# Patient Record
Sex: FEMALE | Race: BLACK OR AFRICAN AMERICAN | Employment: PART TIME | URBAN - METROPOLITAN AREA
[De-identification: names, ages, dates, MRNs, and addresses within clinical notes are randomized per-mention and may not be internally consistent; named-entity substitution may affect disease eponyms.]

---

## 2017-11-27 ENCOUNTER — HOSPITAL ENCOUNTER (OUTPATIENT)
Dept: PHYSICAL THERAPY | Age: 52
Discharge: HOME | End: 2017-11-27
Payer: OTHER GOVERNMENT

## 2017-11-27 PROCEDURE — 97166 OT EVAL MOD COMPLEX 45 MIN: CPT

## 2017-11-27 PROCEDURE — 97535 SELF CARE MNGMENT TRAINING: CPT
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
## 2017-11-27 NOTE — PROGRESS NOTES
[facility]  [address] [facility] [address]  [place], [address]  [phone] fax    Plan of Care/Statement of Necessity for Occupational Therapy Services    Patient name: Katie Rivas Start of Care: 2017   Referral source: Ying Lizama NP : 1965    Medical Diagnosis: Lymphedema of right upper extremity [I89.0]  Primary malignant neoplasm of central portion of right breast in female [facility]) [C50.111]   Onset Date:    Treatment Diagnosis: right UE and chest lymphedmea   Prior Hospitalization: see medical history Provider#: [id number]   Medications: Verified on Patient summary List    Comorbidities: R breast cancer, diabetes, thyroid, HTN   Prior Level of Function: Independent without limitations in ADL and IADL activities. The Plan of Care and following information is based on the information from the initial evaluation. Assessment/ key information: Patient is a 50 y.o. female with a chief complaint of intermittent pain and fullness in the right UE. Patient reports 4 month history of progressive discomfort and swelling in the right UE. She reports that in the recent past 3 weeks she has had some relief and decreased pain. Her pain/discomfort was noted to be greater when increased activities were required of her at work and home. She presents with heavy scarring at the site of surgery in the right breast.  She has decreased mobilization of the tissues around and in the surgical area and was noted to have severe adherence to the chest wall. The patient was also noted to have decreased AROM of the right shoulder and scapula with noted protraction limiting ROM especially with IR of the shoulder. Patient also complained of fatigue with testing to provoke thoracic outlet syndrome given her scarring and UE positioning.         Patient will benefit from skilled OT services to address ROM deficits, analyze and address soft tissue restrictions and lymphedema to address rehab goals per plan of care  Treatment Protocol:   o Manual Lymphatic Drainage   o Soft Tissue Mobilization/MFR   o Compression Bandaging   o Skin Care/Wound care   o Decongestive Exercises/Self MFR/Strengthening   o Education   o Compression Garment    Evaluation Complexity: History MEDIUM Complexity : Expanded review of history including physical, cognitive and psychosocial  history  Examination MEDIUM Complexity : 3-5 performance deficits relating to physical, cognitive , or psychosocial skils that result in activity limitations and / or participation restrictions Clinical Decision Making MEDIUM Complexity : Patient may present with comorbidities that affect occupational performnce. Miniml to moderate modification of tasks or assistance (eg, physical or verbal ) with assesment(s) is necessary to enable patient to complete evaluation   Overall Complexity Rating: MEDIUM    Patient would benefit from OT services for the following problems:  Problem List: Decreased range of motion and Edema effecting function     Treatment Plan may include any combination of the following: Therapeutic exercise, Physical agent/modality, Scar management, Manual therapy, Patient education and Other    Patient / Family readiness to learn indicated by: asking questions and interest    Persons(s) to be included in education:   patient (P)    Barriers to Learning/Limitations: None    Patient Goal (s): \"Reduce pain and discomfort in the right UE, reduce swelling    Patient Self Reported Health Status: good    Rehabilitation Potential: good    Goals:    Short Term Goals: To be accomplished in 4 weeks  1) Pt will be Independent with skin care routine to decrease risk of infection. 2) Patient will demonstrate decongestion of limb by 1-2% . 3) Pt will verbalize with 100% accuracy which signs and symptoms to report immediately to physician concerning their condition. Long Term Goals:  To be accomplished in 8 weeks  1) Pt will be Independent with compression management routine consisting of skin care, decongestive exercises, self-manual lymphatic stimulation techniques, strengthening and motion exercises, and don/doff/care of appropriate compression garment(s). 2) Pt will be Independent in don/doff/care of/wearing schedule of light compression. 3) Patient will demonstrate decongestion of limb by 3% to reduce symptoms  4) Pt will increase ROM of affected limb by 15 degrees in shoulder flexion and abduction in order to increase functional ROM and reduce symptoms    Frequency / Duration: Patient to be seen 2 times per week for 8 weeks:    Patient/ Caregiver education and instruction: Diagnosis, prognosis, self care    Functional Status Measure:   Patient's:NA     The severity rating is based on clinical judgment and the FOTO score. Ana Maria Jerome OT, CHT, CLT 11/27/2017 2:58 PM      Ana Maria Jerome OT 11/27/2017 5:04 PM  ________________________________________________________________________    I certify that the above Therapy Services are being furnished while the patient is under my care. I agree with the treatment plan and certify that this therapy is necessary.     Physician's Signature:____________________  Date:____________Time:__________    Please sign and return to [facility] [address]  [place], [address]  [phone] fax

## 2017-11-29 ENCOUNTER — HOSPITAL ENCOUNTER (OUTPATIENT)
Dept: PHYSICAL THERAPY | Age: 52
Discharge: HOME | End: 2017-11-29
Payer: OTHER GOVERNMENT

## 2017-11-29 PROCEDURE — 97110 THERAPEUTIC EXERCISES: CPT

## 2017-11-29 PROCEDURE — 97140 MANUAL THERAPY 1/> REGIONS: CPT

## 2017-11-29 NOTE — PROGRESS NOTES
OT DAILY TREATMENT NOTE  3-16    Patient Name: Claudette Poisson  Date:2017  : 1965  [x]  Patient  Verified  Payor:  / Plan: [employer] [facility] [address] AND DEPENDENTS / Product Type:  /    In time:1425  Out time:1519  Total Treatment Time (min): 55  Visit #: 4 of 8    Treatment Area: Lymphedema of right upper extremity [I89.0]  Primary malignant neoplasm of central portion of right breast in female ([facility] [address] [C50.111]    SUBJECTIVE  Pain Level (0-10 scale): 1/10  Any medication changes, allergies to medications, adverse drug reactions, diagnosis change, or new procedure performed?: [x] No    [] Yes (see summary sheet for update)  Subjective functional status/changes:   [] No changes reported  Mild complaints of pain with soft tissue work    OBJECTIVE    40 min Therapeutic Exercise:  [x] See flow sheet :   Rationale: increase ROM and lymphatic uptake to improve the patients ability to reduce congestion of lymph fluids in the right UE.     15 min Manual Therapy:  Myofacial release to the chest and underarm areas   Rationale: decrease pain, increase ROM, increase tissue extensibility and decrease edema  to right chest and shoulder/axillary region    With   [] TE   [] TA   [] neuro   [] other: Patient Education: [x] Review HEP    [] Progressed/Changed HEP based on:   [] positioning   [] body mechanics   [] transfers   [] heat/ice application   [] Splint wear/care   [] Sensory re-education   [] scar management      [] other:             Other Objective/Functional Measures: UE GIRTH CHART measured in cm  Date: 17     Side R L   metacarpals 20 20   wrist 17.7 18   FA 31.5 31.5   elbow 32 32   biceps 43.5 43   axillary 42.5 42                      Skin integrity:                                               Temperature [x]normal []cool  [] warm                                              Scars/Burns/incisions: scarring heavy at the site of surgery, no mobilization noted with adherence to the chest wall.                                          Wounds: location:      NA                size:               Depth:     Sensation:  Intermittent numbness and tingling in the R UE during the some activities but negative for phalens, and reverse at this time.       Posture: normal upright posture     ROM:  Shoulder ROM    Date   Flexion 155   Extension 50   Abduction 165      IR 65      Strength: WNL     Breath Pattern Assessment:                        Diaphragm_____normal______________                        Anterior Chest_______normal_________                        Accessory Muscle Use____NA__________     Pain Level (0-10 scale) post treatment: 1/10    ASSESSMENT/Changes in Function: Initiated MFR, soft tissue stretching and scar management to improve lymphatic flow. Patient will continue to benefit from skilled OT services to address ROM deficits, address strength deficits and analyze and address soft tissue restrictions to attain remaining goals. [x]  See Plan of Care  []  See progress note/recertification  []  See Discharge Summary         Progress towards goals / Updated goals:  Short Term Goals: To be accomplished in 4 weeks  1) Pt will be Independent with skin care routine to decrease risk of infection. 2) Patient will demonstrate decongestion of limb by 1-2% . 3) Pt will verbalize with 100% accuracy which signs and symptoms to report immediately to physician concerning their condition.        Long Term Goals: To be accomplished in 8 weeks  1) Pt will be Independent with compression management routine consisting of skin care, decongestive exercises, self-manual lymphatic stimulation techniques, strengthening and motion exercises, and don/doff/care of appropriate compression garment(s). 2) Pt will be Independent in don/doff/care of/wearing schedule of light compression.    3) Patient will demonstrate decongestion of limb by 3% to reduce symptoms  4) Pt will increase ROM of affected limb by 15 degrees in shoulder flexion and abduction in order to increase functional ROM and reduce symptoms    PLAN  []  Upgrade activities as tolerated     [x]  Continue plan of care  []  Update interventions per flow sheet       []  Discharge due to:_  []  Other:_      Dennis Arnold OT 11/29/2017  3:28 PM    Future Appointments  Date Time Provider Fiorella Corina   12/5/2017 4:00 PM Dennis Arnold, OT [facility]   12/7/2017 4:00 PM Dennis Arnold, OT [facility]   12/12/2017 4:00 PM Dennis Arnold, OT [facility]   12/14/2017 4:00 PM Dennis Arnold, OT [facility]   12/19/2017 3:00 PM Dennis Arnold, OT [facility]   12/21/2017 3:00 PM Dennis Arnold, OT [facility]   1/2/2018 4:00 PM Dennis Arnold, OT [facility]   1/5/2018 11:00 AM Dennis Arnold, OT [facility]   1/9/2018 3:00 PM Dennis Arnold, OT [facility]   1/12/2018 11:00 AM Dennis Arnold, OT [facility]   1/16/2018 4:00 PM Dennis Arnold, OT [facility]   1/18/2018 4:00 PM Dennis Arnold, OT [facility]   1/23/2018 3:00 PM Dennis Arnold, OT [facility]   1/25/2018 3:00 PM Dennis Arnold, OT [facility]

## 2017-12-05 ENCOUNTER — HOSPITAL ENCOUNTER (OUTPATIENT)
Dept: PHYSICAL THERAPY | Age: 52
End: 2017-12-05
Payer: OTHER GOVERNMENT

## 2017-12-07 ENCOUNTER — HOSPITAL ENCOUNTER (OUTPATIENT)
Dept: PHYSICAL THERAPY | Age: 52
Discharge: HOME | End: 2017-12-07
Payer: OTHER GOVERNMENT

## 2017-12-07 PROCEDURE — 97140 MANUAL THERAPY 1/> REGIONS: CPT

## 2017-12-07 PROCEDURE — 97110 THERAPEUTIC EXERCISES: CPT

## 2017-12-07 NOTE — PROGRESS NOTES
OT DAILY TREATMENT NOTE - [facility]     Patient Name: Monique Dominguez  Date:2017  : 1965  [x]  Patient  Verified  Payor:  / Plan: [employer] [place] [address] AND DEPENDENTS / Product Type: Rajani Smart /    In Bri Clause time:1700  Total Treatment Time (min): 40  Visit #: 3 of 8    Treatment Area: Lymphedema of right upper extremity [I89.0]  Primary malignant neoplasm of central portion of right breast in female ([facility] [address] [C50.111]    SUBJECTIVE  Pain Level (0-10 scale): 2/10  Any medication changes, allergies to medications, adverse drug reactions, diagnosis change, or new procedure performed?: [x] No    [] Yes (see summary sheet for update)  Subjective functional status/changes:   [] No changes reported  Slight increase in pain where soft tissue work was done. \"    OBJECTIVE      35 min Manual Therapy:   All manual work done on half roll to encourage opening of soft tissue in the chest area   Rationale: increase ROM and increase tissue extensibility to for     5 min Self Care/Home Management: scar  massage   Rationale: education  to improve the patients ability to increase pliability to the scar tissues in the chest.    With   [] TE   [] TA   [] neuro   [] other: Patient Education: [x] Review HEP    [] Progressed/Changed HEP based on:   [] positioning   [] body mechanics   [] transfers   [] heat/ice application   [] Splint wear/care   [] Sensory re-education   [] scar management      [] other:            Other Objective/Functional Measures: UE GIRTH CHART measured in cm  Date: 17      Side R L   metacarpals 20 20   wrist 17.7 18   FA 31.5 31.5   elbow 32 32   biceps 43.5 43   axillary 42.5 42                             Skin integrity:                                               Temperature [x]normal []cool  [] warm                                              Scars/Burns/incisions: scarring heavy at the site of surgery, no mobilization noted with adherence to the chest wall.                                           Wounds: location:      NA                size:               Depth:      Sensation:  Intermittent numbness and tingling in the R UE during the some activities but negative for phalens, and reverse at this time.        Posture: normal upright posture      ROM:  Shoulder ROM     Date   Flexion 155   Extension 50   Abduction 165      IR 65       Strength: WNL      Breath Pattern Assessment:                        Diaphragm_____normal______________                        Anterior Chest_______normal_________                        Accessory Muscle Use____NA__________  Valentin Arrant  Pain Level (0-10 scale) post treatment: 1/10     ASSESSMENT/Changes in Function: Imporve tissue mobilization and decrease pain per patient.     Patient will continue to benefit from skilled OT services to address ROM deficits, address strength deficits and analyze and address soft tissue restrictions to attain remaining goals.      [x]  See Plan of Care  []  See progress note/recertification  []  See Discharge Summary      Progress towards goals / Updated goals:  Short Term Goals: To be accomplished in 4 weeks  1) Pt will be Independent with skin care routine to decrease risk of infection. Goal met  2) Patient will demonstrate decongestion of limb by 1-2% . 3) Pt will verbalize with 100% accuracy which signs and symptoms to report immediately to physician concerning their condition. Goal met          Long Term Goals: To be accomplished in 8 weeks  1) Pt will be Independent with compression management routine consisting of skin care, decongestive exercises, self-manual lymphatic stimulation techniques, strengthening and motion exercises, and don/doff/care of appropriate compression garment(s). 2) Pt will be Independent in don/doff/care of/wearing schedule of light compression.    3) Patient will demonstrate decongestion of limb by 3% to reduce symptoms  4) Pt will increase ROM of affected limb by 15 degrees in shoulder flexion and abduction in order to increase functional ROM and reduce symptoms.     PLAN  []  Upgrade activities as tolerated     [x]  Continue plan of care  []  Update interventions per flow sheet       []  Discharge due to:_  []  Other:_      Edgeulices Haus, OT 12/7/2017  6:43 PM    Future Appointments  Date Time Provider Fiorella Arriaga   12/12/2017 4:00 PM Shiraz Delaney, OT [facility]   12/14/2017 4:00 PM Edger Haus, OT [facility]   12/19/2017 3:00 PM Edger Haus, OT [facility]   12/21/2017 3:00 PM Edger Haus, OT [facility]   1/2/2018 4:00 PM Edger Haus, OT [facility]   1/5/2018 11:00 AM Edger Haus, OT [facility]   1/9/2018 3:00 PM Edger Haus, OT [facility]   1/12/2018 11:00 AM Edger Haus, OT [facility]   1/16/2018 4:00 PM Edger Haus, OT [facility]   1/18/2018 4:00 PM Edger Haus, OT [facility]   1/23/2018 3:00 PM Edger Haus, OT [facility]   1/25/2018 3:00 PM Edger Haus, OT [facility]   1/30/2018 4:00 PM Edger Haus, OT [facility]

## 2017-12-12 ENCOUNTER — HOSPITAL ENCOUNTER (OUTPATIENT)
Dept: PHYSICAL THERAPY | Age: 52
Discharge: HOME | End: 2017-12-12
Payer: OTHER GOVERNMENT

## 2017-12-12 PROCEDURE — 97110 THERAPEUTIC EXERCISES: CPT

## 2017-12-12 PROCEDURE — 97140 MANUAL THERAPY 1/> REGIONS: CPT

## 2017-12-12 NOTE — PROGRESS NOTES
OT DAILY TREATMENT NOTE  3-16    Patient Name: Bernardo West  Date:2017  : 1965  [x]  Patient  Verified  Payor: [facility] / Plan: [employer] [facility] [address] AND DEPENDENTS / Product Type: Jacquelyne Ormond /    In Iraida Clause time:1710  Total Treatment Time (min): 50  Visit #: 4 of 8    Treatment Area: Lymphedema of right upper extremity [I89.0]  Primary malignant neoplasm of central portion of right breast in female ([facility] [address] [C50.111]    SUBJECTIVE  Pain Level (0-10 scale): 0/10  Any medication changes, allergies to medications, adverse drug reactions, diagnosis change, or new procedure performed?: [x] No    [] Yes (see summary sheet for update)  Subjective functional status/changes:   [] No changes reported  \"I was able to sleep on my right side which I have not been able to do for a long time. \"    OBJECTIVE    30 min Therapeutic Exercise:  [x] See flow sheet :   Rationale: increase ROM to improve the patients ability to improve functional use of the right UE    20 min Manual Therapy:  MFR, IASTM with Graston tool # 3, using sweeping and fanning techniques     Rationale: decrease pain, increase ROM and increase tissue extensibility to improve pain and flow of the lymphatic system in the right UE quadrant      With   [] TE   [] TA   [] neuro   [] other: Patient Education: [x] Review HEP    [] Progressed/Changed HEP based on:   [] positioning   [] body mechanics   [] transfers   [] heat/ice application   [] Splint wear/care   [] Sensory re-education   [] scar management      [] other:             Other Objective/Functional Measures: UE GIRTH CHART measured in cm  Date: 17   Side R L     metacarpals 20 20 20.5 20   wrist 17.7 18 17.5 18   FA 31.5 31.5 30 31.5   elbow 32 32 31 33   biceps 43.5 43 44.5 44.5   axillary 42.5 42 42 43.5                        Skin integrity:                                               Temperature [x]normal []cool  [] warm Scars/Burns/incisions: scarring heavy at the site of surgery, no mobilization noted with adherence to the chest wall.                                          Wounds: location:      NA                size:               Depth:     Sensation:  Intermittent numbness and tingling in the R UE during the some activities but negative for phalens, and reverse at this time.       Posture: normal upright posture     ROM:  Shoulder ROM    11/27/27 12/12/17   Flexion 155 165   Extension 50 50   Abduction 165 165       IR 65            Pain Level (0-10 scale) post treatment: 0/10    ASSESSMENT/Changes in Function: initiated stretching of pectoralis and surrounding muscles to improve posture and scapular positioning to increase lymphatic flow. Patient will continue to benefit from skilled OT services to modify and progress therapeutic interventions, address ROM deficits, analyze and address soft tissue restrictions, analyze and cue movement patterns and assess and modify postural abnormalities to attain remaining goals. []  See Plan of Care  []  See progress note/recertification  []  See Discharge Summary         Progress towards goals / Updated goals:  Short Term Goals: To be accomplished in 4 weeks  1) Pt will be Independent with skin care routine to decrease risk of infection. Goal met  2) Patient will demonstrate decongestion of limb by 1-2% . 3) Pt will verbalize with 100% accuracy which signs and symptoms to report immediately to physician concerning their condition. Goal met          Long Term Goals: To be accomplished in 8 weeks  1) Pt will be Independent with compression management routine consisting of skin care, decongestive exercises, self-manual lymphatic stimulation techniques, strengthening and motion exercises, and don/doff/care of appropriate compression garment(s). 2) Pt will be Independent in don/doff/care of/wearing schedule of light compression.    3) Patient will demonstrate decongestion of limb by 3% to reduce symptoms  4) Pt will increase ROM of affected limb by 15 degrees in shoulder flexion and abduction in order to increase functional ROM and reduce symptoms.     PLAN  []  Upgrade activities as tolerated     [x]  Continue plan of care  []  Update interventions per flow sheet       []  Discharge due to:_  []  Other:_      Kasia Cooper, OT 12/12/2017  5:17 PM    Future Appointments  Date Time Provider Fiorella Corina   12/14/2017 4:00 PM Kasia Cooper, OT [facility]   12/19/2017 3:00 PM Kasia Cooper, OT [facility]   12/21/2017 3:00 PM Kasia Cooper, OT [facility]   1/2/2018 4:00 PM Kasia Cooper, OT [facility]   1/5/2018 11:00 AM Kasia Cooper, OT [facility]   1/9/2018 3:00 PM Kasia Cooper, OT [facility]   1/12/2018 11:00 AM Kasia Cooper, OT [facility]   1/16/2018 4:00 PM Kasia Cooper, OT [facility]   1/18/2018 4:00 PM Kasia Cooper, OT [facility]   1/23/2018 3:00 PM Kasia Cooper, OT [facility]   1/25/2018 3:00 PM Kasia Cooper, OT [facility]   1/30/2018 4:00 PM Kasia Cooper, OT [facility]

## 2017-12-14 ENCOUNTER — APPOINTMENT (OUTPATIENT)
Dept: PHYSICAL THERAPY | Age: 52
End: 2017-12-14
Payer: OTHER GOVERNMENT

## 2017-12-19 ENCOUNTER — HOSPITAL ENCOUNTER (OUTPATIENT)
Dept: PHYSICAL THERAPY | Age: 52
Discharge: HOME | End: 2017-12-19
Payer: OTHER GOVERNMENT

## 2017-12-19 PROCEDURE — 97535 SELF CARE MNGMENT TRAINING: CPT

## 2017-12-19 PROCEDURE — 97140 MANUAL THERAPY 1/> REGIONS: CPT

## 2017-12-19 NOTE — PROGRESS NOTES
OT DAILY TREATMENT NOTE  3-16    Patient Name: Claudette Poisson  Date:2017  : 1965  [x]  Patient  Verified  Payor:  / Plan: [facility] DEPENDENTS / Product Type: Kaushik Mon /    In time:1515  Out time:1610  Total Treatment Time (min): 55  Visit #: 5 of 8    Treatment Area: Lymphedema of right upper extremity [I89.0]  Primary malignant neoplasm of central portion of right breast in female ([facility] [address] [C50.111]    SUBJECTIVE  Pain Level (0-10 scale): 0/10  Any medication changes, allergies to medications, adverse drug reactions, diagnosis change, or new procedure performed?: [x] No    [] Yes (see summary sheet for update)  Subjective functional status/changes:   [] No changes reported  \"I had a really heavy feeling in my arm on Saturday and it felt swollen, I did do a lot of work on that evening, cleaning and stuff around the house. \"    OBJECTIVE  10 min Manual Therapy: MFR   Rationale: increase tissue extensibility to right breast tissues and axillary area    45 min Self Care/Home Management: SMLD   Rationale: educationa and demonstration  to improve the patients ability to mobilize lymphatic and improve lymphatic uptake independently at home.     With   [] TE   [] TA   [] neuro   [x] other: Patient Education: [x] Review HEP    [] Progressed/Changed HEP based on:   [] positioning   [] body mechanics   [] transfers   [] heat/ice application   [] Splint wear/care   [] Sensory re-education   [] scar management      [x] other: SMLD            Other Objective/Functional Measures: UE GIRTH CHART measured in cm         Date: 17    Side R L       metacarpals 20 20 20.5 20   wrist 17.7 18 17.5 18   FA 31.5 31.5 30 31.5   elbow 32 32 31 33   biceps 43.5 43 44.5 44.5   axillary 42.5 42 42 43.5                                  Skin integrity:                                               Temperature [x]normal []cool  [] warm                                              Scars/Burns/incisions: scarring heavy at the site of surgery, no mobilization noted with adherence to the chest wall.                                           Wounds: location:      NA                size:               Depth:      Sensation:  Intermittent numbness and tingling in the R UE during the some activities but negative for phalens, and reverse at this time.        Posture: normal upright posture      ROM:  Shoulder ROM     11/27/27 12/12/17   Flexion 155 165   Extension 50 50   Abduction 165 165        IR 65                                    Pain Level (0-10 scale) post treatment: 0/10     ASSESSMENT/Changes in Function: SMLD sequence instruction, video and physical and verbal instruction. Patient able to demonstrate all independently.     Patient will continue to benefit from skilled OT services to modify and progress therapeutic interventions, address ROM deficits, analyze and address soft tissue restrictions, analyze and cue movement patterns and assess and modify postural abnormalities to attain remaining goals.      [x]  See Plan of Care  []  See progress note/recertification  []  See Discharge Summary      Progress towards goals / Updated goals:  Short Term Goals: To be accomplished in 4 weeks  1) Pt will be Independent with skin care routine to decrease risk of infection. Goal met  2) Patient will demonstrate decongestion of limb by 1-2% . 3) Pt will verbalize with 100% accuracy which signs and symptoms to report immediately to physician concerning their condition. Goal met          Long Term Goals: To be accomplished in 8 weeks  1) Pt will be Independent with compression management routine consisting of skin care, decongestive exercises, self-manual lymphatic stimulation techniques, strengthening and motion exercises, and don/doff/care of appropriate compression garment(s).   2) Pt will be Independent in don/doff/care of/wearing schedule of light compression.    3) Patient will demonstrate decongestion of limb by 3% to reduce symptoms  4) Pt will increase ROM of affected limb by 15 degrees in shoulder flexion and abduction in order to increase functional ROM and reduce symptoms.       PLAN  []  Upgrade activities as tolerated     [x]  Continue plan of care  []  Update interventions per flow sheet       []  Discharge due to:_  []  Other:_      Doneen [place], OT 12/19/2017  5:50 PM    Future Appointments  Date Time Provider Fiorella Arriaga   12/21/2017 3:00 PM Doneen [place], OT [facility]   1/2/2018 4:00 PM Doneen [place], OT [facility]   1/5/2018 11:00 AM Doneen [place], OT [facility]   1/9/2018 3:00 PM Doneen [place], OT [facility]   1/12/2018 11:00 AM Doneen [place], OT [facility]   1/16/2018 4:00 PM Doneen [place], OT [facility]   1/18/2018 4:00 PM Doneen [place], OT [facility]   1/23/2018 3:00 PM Doneen [place], OT [facility]   1/25/2018 3:00 PM Doneen [place], OT [facility]   1/30/2018 4:00 PM Doneen [place], OT [facility]

## 2017-12-21 ENCOUNTER — HOSPITAL ENCOUNTER (OUTPATIENT)
Dept: PHYSICAL THERAPY | Age: 52
Discharge: HOME | End: 2017-12-21
Payer: OTHER GOVERNMENT

## 2017-12-21 PROCEDURE — 97140 MANUAL THERAPY 1/> REGIONS: CPT

## 2017-12-22 NOTE — PROGRESS NOTES
OT DAILY TREATMENT NOTE  3-16    Patient Name: Miguelito Bragg  Date:2017  : 1965  [x]  Patient  Verified  Payor:  / Plan: [employer] [facility] [address] AND DEPENDENTS / Product Type:  /    In time:1505  Out time:1545  Total Treatment Time (min): 40  Visit #: 6 of 8    Treatment Area: Lymphedema of right upper extremity [I89.0]  Primary malignant neoplasm of central portion of right breast in female ([facility] [address] [C50.111]    SUBJECTIVE  Pain Level (0-10 scale): 0/10  Any medication changes, allergies to medications, adverse drug reactions, diagnosis change, or new procedure performed?: [x] No    [] Yes (see summary sheet for update)  Subjective functional status/changes:   [] No changes reported  \"I will be out of town for the holidays. \"    OBJECTIVE  40 min Manual Therapy: MFR, IASTM with Graston tool # 3, 4, using sweeping, fanning and brushing techniques, education   Rationale: increase tissue extensibility to right breast tissues and axillary area     With   [] TE   [] TA   [] neuro   [x] other: Patient Education: [x] Review HEP    [] Progressed/Changed HEP based on:   [] positioning   [] body mechanics   [] transfers   [] heat/ice application   [] Splint wear/care   [] Sensory re-education   [] scar management      [x] other: SMLD          Other Objective/Functional Measures: UE GIRTH CHART measured in cm              Date: 17     Side R L   R L     metacarpals 20 20 20.5 20   wrist 17.7 18 17.5 18   FA 31.5 31.5 30 31.5   elbow 32 32 31 33   biceps 43.5 43 44.5 44.5   axillary 42.5 42 42 43.5                                       Skin integrity:                                               Temperature [x]normal []cool  [] warm                                              Scars/Burns/incisions: scarring heavy at the site of surgery, no mobilization noted with adherence to the chest wall.                                           Wounds: location:      NA                size:               DepthGarlin Lara  Sensation:  Intermittent numbness and tingling in the R UE during the some activities but negative for phalens, and reverse at this time.        Posture: normal upright posture      ROM:  Shoulder ROM     11/27/27 12/12/17   Flexion 155 165   Extension 50 50   Abduction 165 165         IR 65                 Pain Level (0-10 scale) post treatment: 0/10      ASSESSMENT/Changes in Function: improving scar mobility and improving skin mobility in the right upper quadrant      Patient will continue to benefit from skilled OT services to modify and progress therapeutic interventions, address ROM deficits, analyze and address soft tissue restrictions, analyze and cue movement patterns and assess and modify postural abnormalities to attain remaining goals.      [x]  See Plan of Care  []  See progress note/recertification  []  See Discharge Summary      Progress towards goals / Updated goals:  Short Term Goals: To be accomplished in 4 weeks  1) Pt will be Independent with skin care routine to decrease risk of infection. Goal met  2) Patient will demonstrate decongestion of limb by 1-2% . 3) Pt will verbalize with 100% accuracy which signs and symptoms to report immediately to physician concerning their condition. Goal met          Long Term Goals: To be accomplished in 8 weeks  1) Pt will be Independent with compression management routine consisting of skin care, decongestive exercises, self-manual lymphatic stimulation techniques, strengthening and motion exercises, and don/doff/care of appropriate compression garment(s). 2) Pt will be Independent in don/doff/care of/wearing schedule of light compression. 3) Patient will demonstrate decongestion of limb by 3% to reduce symptoms  4) Pt will increase ROM of affected limb by 15 degrees in shoulder flexion and abduction in order to increase functional ROM and reduce symptoms.     PLAN  []  Upgrade activities as tolerated     []  Continue plan of care  []  Update interventions per flow sheet       []  Discharge due to:_  []  Other:_      Rashard Quintero OT 12/21/2017  7:14 PM    Future Appointments  Date Time Provider Fiorella Arriaga   1/2/2018 4:00 PM Tracee Delaney, OT [facility]   1/5/2018 11:00 AM Rashard Quintero OT [facility]   1/9/2018 3:00 PM Rashard Quintero OT [facility]   1/12/2018 11:00 AM Rashard Quintero OT [facility]   1/16/2018 4:00 PM Rashard Quintero OT [facility]   1/18/2018 4:00 PM Rashard Quintero OT [facility]   1/23/2018 3:00 PM Rashard Quintero OT [facility]   1/25/2018 3:00 PM Rashard Quintero OT [facility]   1/30/2018 4:00 PM Rashard Quintero OT [facility]

## 2018-01-02 ENCOUNTER — APPOINTMENT (OUTPATIENT)
Dept: PHYSICAL THERAPY | Age: 53
End: 2018-01-02
Payer: OTHER GOVERNMENT

## 2018-01-05 ENCOUNTER — APPOINTMENT (OUTPATIENT)
Dept: PHYSICAL THERAPY | Age: 53
End: 2018-01-05
Payer: OTHER GOVERNMENT

## 2018-01-09 ENCOUNTER — APPOINTMENT (OUTPATIENT)
Dept: PHYSICAL THERAPY | Age: 53
End: 2018-01-09
Payer: OTHER GOVERNMENT

## 2018-01-12 ENCOUNTER — APPOINTMENT (OUTPATIENT)
Dept: PHYSICAL THERAPY | Age: 53
End: 2018-01-12
Payer: OTHER GOVERNMENT

## 2018-01-18 ENCOUNTER — APPOINTMENT (OUTPATIENT)
Dept: PHYSICAL THERAPY | Age: 53
End: 2018-01-18
Payer: OTHER GOVERNMENT

## 2018-01-25 ENCOUNTER — HOSPITAL ENCOUNTER (OUTPATIENT)
Dept: PHYSICAL THERAPY | Age: 53
Discharge: HOME | End: 2018-01-25
Payer: OTHER GOVERNMENT

## 2018-01-25 PROCEDURE — 97140 MANUAL THERAPY 1/> REGIONS: CPT

## 2018-01-25 NOTE — PROGRESS NOTES
OT DAILY TREATMENT NOTE  3-16    Patient Name: Mirna White  Date:2018  : 1965  [x]  Patient  Verified  Payor:  / Plan: [facility]  RE[employer]REES AND DEPENDENTS / Product Type: Saratha Hemp /    In time:0910  Out time:1000  Total Treatment Time (min):50  Visit #: 1 of 16    Treatment Area: Lymphedema of right upper extremity [I89.0]  Primary malignant neoplasm of central portion of right breast in female ([facility] [address] [C50.111]    SUBJECTIVE  Pain Level (0-10 scale): 0/10  Any medication changes, allergies to medications, adverse drug reactions, diagnosis change, or new procedure performed?: [x] No    [] Yes (see summary sheet for update)  Subjective functional status/changes:   [x] No changes reported      OBJECTIVE    50 min Manual Therapy:  MLD right UE with ipsilateral/contralateral sides included   Rationale: increase tissue extensibility, decrease edema  and increase lymphatic uptake to right UE      With   [] TE   [] TA   [] neuro   [] other: Patient Education: [x] Review HEP    [] Progressed/Changed HEP based on:   [] positioning   [] body mechanics   [] transfers   [] heat/ice application   [] Splint wear/care   [] Sensory re-education   [] scar management      [] other:             Other Objective/Functional Measures: UE GIRTH CHART measured in cm  Date: 17   Side R L R R R   metacarpals 20 20 20.5 20.5 20.3   wrist 17.7 18 17.5 18 18.2   FA 31.5 31.5 30 30.3 32   elbow 32 32 31 31.9 32.5   biceps 43.5 43 44.5 44.5 46   axillary 42.5 42 42 42.5 44.8                                             Pain Level (0-10 scale) post treatment: 0/10    ASSESSMENT/Changes in Function: Initiated MLD to R UE and ipsilateral/contralateral sides. Patient will continue to benefit from skilled OT services to analyze and address soft tissue restrictions and lymphedema to attain remaining goals.      [x]  See Plan of Care  []  See progress note/recertification  []  See Discharge Summary         Progress towards goals / Updated goals:  Goals:    Short Term Goals: To be accomplished in 4 weeks  1) Pt will be Independent with skin care routine to decrease risk of infection. 2) Patient will demonstrate decongestion of limb by 1-2% . 3) Pt will verbalize with 100% accuracy which signs and symptoms to report immediately to physician concerning their condition.      Long Term Goals: To be accomplished in 8 weeks  1) Pt will be Independent with compression management routine consisting of skin care, decongestive exercises, self-manual lymphatic stimulation techniques, strengthening and motion exercises, and don/doff/care of appropriate compression garment(s). 2) Pt will be Independent in don/doff/care of/wearing schedule of light compression.    3) Patient will demonstrate decongestion of limb by 3% to reduce symptoms  4) Pt will increase ROM of affected limb by 15 degrees in shoulder flexion and abduction in order to increase functional ROM and reduce symptoms       PLAN  []  Upgrade activities as tolerated     [x]  Continue plan of care  []  Update interventions per flow sheet       []  Discharge due to:_  []  Other:_      Mejia Cazares OT 1/25/2018  1:30 PM    Future Appointments  Date Time Provider Fiorella Arriaga   1/30/2018 4:00 PM Mejia Cazares OT [facility]

## 2018-01-30 ENCOUNTER — APPOINTMENT (OUTPATIENT)
Dept: PHYSICAL THERAPY | Age: 53
End: 2018-01-30
Payer: OTHER GOVERNMENT

## 2018-02-06 ENCOUNTER — HOSPITAL ENCOUNTER (OUTPATIENT)
Dept: PHYSICAL THERAPY | Age: 53
Discharge: HOME | End: 2018-02-06
Payer: OTHER GOVERNMENT

## 2018-02-06 PROCEDURE — 97535 SELF CARE MNGMENT TRAINING: CPT

## 2018-02-13 ENCOUNTER — APPOINTMENT (OUTPATIENT)
Dept: PHYSICAL THERAPY | Age: 53
End: 2018-02-13
Payer: OTHER GOVERNMENT

## 2018-02-15 ENCOUNTER — APPOINTMENT (OUTPATIENT)
Dept: PHYSICAL THERAPY | Age: 53
End: 2018-02-15
Payer: OTHER GOVERNMENT

## 2018-02-20 ENCOUNTER — HOSPITAL ENCOUNTER (OUTPATIENT)
Dept: PHYSICAL THERAPY | Age: 53
End: 2018-02-20
Payer: OTHER GOVERNMENT

## 2018-02-27 ENCOUNTER — HOSPITAL ENCOUNTER (OUTPATIENT)
Dept: PHYSICAL THERAPY | Age: 53
Discharge: HOME | End: 2018-02-27
Payer: OTHER GOVERNMENT

## 2018-02-27 PROCEDURE — 97140 MANUAL THERAPY 1/> REGIONS: CPT

## 2018-02-27 NOTE — PROGRESS NOTES
OT DAILY TREATMENT NOTE  3-16    Patient Name: Kiarra Edwards  Date:2018  : 1965  [x]  Patient  Verified  Payor:  / Plan: [facility]  RETIREES AND DEPENDENTS / Product Type: [employer] /    In time:1705  Out time: 1745  Total Treatment Time (min):45  Visit #: 3 of 16    Treatment Area: Lymphedema of right upper extremity [I89.0]  Primary malignant neoplasm of central portion of right breast in female ([facility] [address] [C50.111]    SUBJECTIVE  Pain Level (0-10 scale): 0/10  Any medication changes, allergies to medications, adverse drug reactions, diagnosis change, or new procedure performed?: [x] No    [] Yes (see summary sheet for update)  Subjective functional status/changes:   [] No changes reported  No new complaints, improved sinuses. OBJECTIVE    40 min Manual Therapy: MLD to bilateral UE re-routing to ipsilateral side. Rationale: increase tissue extensibility and decrease edema  to right UE.    5 min Self Care/Home Management: importance of consistency and need for compression wrapping   Rationale: education. to improve the patients ability to help manage lymphedema at home.     With   [] TE   [] TA   [] neuro   [] other: Patient Education: [x] Review HEP    [] Progressed/Changed HEP based on:   [] positioning   [] body mechanics   [] transfers   [] heat/ice application   [] Splint wear/care   [] Sensory re-education   [] scar management      [] other:             Other Objective/Functional Measures:      With   [] TE   [] TA   [] neuro   [] other: Patient Education: [x] Review HEP    [] Progressed/Changed HEP based on:   [] positioning   [] body mechanics   [] transfers   [] heat/ice application   [] Splint wear/care   [] Sensory re-education   [] scar management      [] other:           Other Objective/Functional Measures: UE GIRTH CHART measured in cm  Date: 17   Side R L R R R R   metacarpals 20 20 20.5 20.5 20.3    wrist 17.7 18 17.5 18 18.2 FA 31.5 31.5 30 30.3 32    elbow 32 32 31 31.9 32.5    biceps 43.5 43 44.5 44.5 46    axillary 42.5 42 42 42.5 44.8                                                  Pain Level (0-10 scale) post treatment: 0/10      ASSESSMENT/Changes in Function: Patient has received lymphedema pump for home. She is awaiting instruction from representative for how to use. Patient will continue to benefit from skilled OT services to analyze and address soft tissue restrictions and lymphedema to attain remaining goals.      [x]  See Plan of Care  []  See progress note/recertification  []  See Discharge Summary      Progress towards goals / Updated goals:  Goals:    Short Term Goals: To be accomplished in 4 weeks  1) Pt will be Independent with skin care routine to decrease risk of infection. 2) Patient will demonstrate decongestion of limb by 1-2% . 3) Pt will verbalize with 100% accuracy which signs and symptoms to report immediately to physician concerning their condition.      Long Term Goals: To be accomplished in 8 weeks  1) Pt will be Independent with compression management routine consisting of skin care, decongestive exercises, self-manual lymphatic stimulation techniques, strengthening and motion exercises, and don/doff/care of appropriate compression garment(s). 2) Pt will be Independent in don/doff/care of/wearing schedule of light compression.    3) Patient will demonstrate decongestion of limb by 3% to reduce symptoms  4) Pt will increase ROM of affected limb by 15 degrees in shoulder flexion and abduction in order to increase functional ROM and reduce symptoms    PLAN  []  Upgrade activities as tolerated     [x]  Continue plan of care  []  Update interventions per flow sheet       []  Discharge due to:_  []  Other:_      Malachi Sandoval OT 2/27/2018  5:11 PM    Future Appointments  Date Time Provider Fiorella Arriaga   3/6/2018 5:00 PM Malachi Sandoval OT [facility]

## 2018-03-06 ENCOUNTER — HOSPITAL ENCOUNTER (OUTPATIENT)
Dept: PHYSICAL THERAPY | Age: 53
Discharge: HOME | End: 2018-03-06
Payer: OTHER GOVERNMENT

## 2018-03-06 PROCEDURE — 97110 THERAPEUTIC EXERCISES: CPT

## 2018-03-06 PROCEDURE — 97140 MANUAL THERAPY 1/> REGIONS: CPT

## 2018-03-15 ENCOUNTER — HOSPITAL ENCOUNTER (OUTPATIENT)
Dept: PHYSICAL THERAPY | Age: 53
Discharge: HOME | End: 2018-03-15
Payer: OTHER GOVERNMENT

## 2018-03-15 PROCEDURE — 97535 SELF CARE MNGMENT TRAINING: CPT

## 2018-03-15 NOTE — PROGRESS NOTES
OT DAILY TREATMENT NOTE  3-16    Patient Name: Delgado Finnegan  Date:3/15/2018  : 1965  [x]  Patient  Verified  Payor:  / Plan: [facility]  RETIREES AND DEPENDENTS / Product Type:  /    In time:1505  Out time:1610  Total Treatment Time (min): 65  Visit #: 2 of 8    Treatment Area: Lymphedema of right upper extremity [I89.0]  Primary malignant neoplasm of central portion of right breast in female ([facility] [address] [C50.111]    SUBJECTIVE  Pain Level (0-10 scale): 0/10  Any medication changes, allergies to medications, adverse drug reactions, diagnosis change, or new procedure performed?: [x] No    [] Yes (see summary sheet for update)  Subjective functional status/changes:   [] No changes reported  \"I believe I understand how to do it. \"    OBJECTIVE    60 min Self Care/Home Management: compression wrapping to the right UE   Rationale: patient and  instucted on compression wrapping for the right R UE  to improve the patients ability to reduce lymphedema. With   [] TE   [] TA   [] neuro   [] other: Patient Education: [x] Review HEP    [] Progressed/Changed HEP based on:   [] positioning   [] body mechanics   [] transfers   [] heat/ice application   [] Splint wear/care   [] Sensory re-education   [] scar management      [] other:             Other Objective/Functional Measures: UE GIRTH CHART measured in cm  Date: 17   Side R L R R R R   metacarpals 20 20 20.5 20.5 20.3 20     wrist 17.7 18 17.5 18 18.2      FA 31.5 31.5 30 30.3 32      elbow 32 32 31 31.9 32.5      biceps 43.5 43 44.5 44.5 46      axillary 42.5 42 42 42.5 44.8                                                        Pain Level (0-10 scale) post treatment: 0/10      ASSESSMENT/Changes in Function: Initiated compression wrapping for the right UE. Patients  instructed in wrapping for home. He was able to demonstrate good technique with minimal verbal cues.     Patient will continue to benefit from skilled OT services to analyze and address soft tissue restrictions and lymphedema to attain remaining goals.      [x]  See Plan of Care  []  See progress note/recertification  []  See Discharge Summary      Progress towards goals / Updated goals:  Goals:    Short Term Goals: To be accomplished in 4 weeks  1) Pt will be Independent with skin care routine to decrease risk of infection. 2) Patient will demonstrate decongestion of limb by 1-2% . 3) Pt will verbalize with 100% accuracy which signs and symptoms to report immediately to physician concerning their condition.      Long Term Goals: To be accomplished in 8 weeks  1) Pt will be Independent with compression management routine consisting of skin care, decongestive exercises, self-manual lymphatic stimulation techniques, strengthening and motion exercises, and don/doff/care of appropriate compression garment(s). 2) Pt will be Independent in don/doff/care of/wearing schedule of light compression.    3) Patient will demonstrate decongestion of limb by 3% to reduce symptoms  4) Pt will increase ROM of affected limb by 15 degrees in shoulder flexion and abduction in order to increase functional ROM and reduce symptoms        PLAN  []  Upgrade activities as tolerated     [x]  Continue plan of care  []  Update interventions per flow sheet       []  Discharge due to:_  []  Other:_      Shree Buys, OT 3/15/2018  4:49 PM    Future Appointments  Date Time Provider Fiorella Arriaga   3/20/2018 4:00 PM Luis A Ware [place], OT [facility]   3/22/2018 3:00 PM Shree Buys, OT [facility]   3/27/2018 4:00 PM Shree Buys, OT [facility]   3/29/2018 4:00 PM Shree Buys, OT [facility]   4/3/2018 4:00 PM Shree Buys, OT [facility]   4/5/2018 4:00 PM Shree Buys, OT [facility]

## 2018-03-20 ENCOUNTER — HOSPITAL ENCOUNTER (OUTPATIENT)
Dept: PHYSICAL THERAPY | Age: 53
Discharge: HOME | End: 2018-03-20
Payer: OTHER GOVERNMENT

## 2018-03-20 PROCEDURE — 97140 MANUAL THERAPY 1/> REGIONS: CPT

## 2018-03-20 PROCEDURE — 97535 SELF CARE MNGMENT TRAINING: CPT

## 2018-03-20 NOTE — PROGRESS NOTES
OT DAILY TREATMENT NOTE  3-16    Patient Name: Kiarra Edwards  Date:3/20/2018  : 1965  [x]  Patient  Verified  Payor:  / Plan: [facility]  RETIREES AND DEPENDENTS / Product Type: [employer] /    In [place] time:1655  Total Treatment Time (min): 54  Visit #: 3 of 8    Treatment Area: Lymphedema of right upper extremity [I89.0]  Primary malignant neoplasm of central portion of right breast in female ([facility] [address] [C50.111]    SUBJECTIVE  Pain Level (0-10 scale): 0/10  Any medication changes, allergies to medications, adverse drug reactions, diagnosis change, or new procedure performed?: [x] No    [] Yes (see summary sheet for update)  Subjective functional status/changes:   [x] No changes reported    OBJECTIVE      30 min Manual Therapy:  MLD ipsilateral and contralateral sides to R UE   Rationale: increase tissue extensibility and decrease edema  to right UE    25 min Self Care/Home Management: compression wrapping, education to care of wraps, compression garment selections   Rationale: compression wrapping and education  to improve the patients ability to understand process for CDT and transition to garments    With   [] TE   [] TA   [] neuro   [] other: Patient Education: [x] Review HEP    [] Progressed/Changed HEP based on:   [] positioning   [] body mechanics   [] transfers   [] heat/ice application   [] Splint wear/care   [] Sensory re-education   [] scar management      [] other:             Other Objective/Functional Measures: UE GIRTH CHART measured in cm  Date: 11/27/17    12/12/17 12/19/17 1/23/18 2/27/18 3/6/18 3/20/18   Side R L R R R R R R   metacarpals 20 20 20.5 20.5 20.3 20   20.3 20.5   wrist 17.7 18 17.5 18 18.2   18 18 18   FA 31.5 31.5 30 30.3 32   31.0 31.7 31.9   elbow 32 32 31 31.9 32.5   31.9 32.5 32.7   biceps 43.5 43 44.5 44.5 46   46 44 46   axillary 42.5 42 42 42.5 44.8   43 42.5 [facility]   073. 2                 +1       Pain Level (0-10 scale) post treatment: 0/10      ASSESSMENT/Changes in Function: Consistency with wrapping discussed and technique. Care for wrapping discussed     Patient will continue to benefit from skilled OT services to analyze and address soft tissue restrictions and lymphedema to attain remaining goals.      [x]  See Plan of Care  []  See progress note/recertification  []  See Discharge Summary      Progress towards goals / Updated goals:  Goals:    Short Term Goals: To be accomplished in 4 weeks  1) Pt will be Independent with skin care routine to decrease risk of infection. 2) Patient will demonstrate decongestion of limb by 1-2% . 3) Pt will verbalize with 100% accuracy which signs and symptoms to report immediately to physician concerning their condition.      Long Term Goals: To be accomplished in 8 weeks  1) Pt will be Independent with compression management routine consisting of skin care, decongestive exercises, self-manual lymphatic stimulation techniques, strengthening and motion exercises, and don/doff/care of appropriate compression garment(s). 2) Pt will be Independent in don/doff/care of/wearing schedule of light compression.    3) Patient will demonstrate decongestion of limb by 3% to reduce symptoms  4) Pt will increase ROM of affected limb by 15 degrees in shoulder flexion and abduction in order to increase functional ROM and reduce symptoms        PLAN  []  Upgrade activities as tolerated     [x]  Continue plan of care  []  Update interventions per flow sheet       []  Discharge due to:_  []  Other:_      Zulema Young OT 3/20/2018  5:06 PM    Future Appointments  Date Time Provider Fiorella Arriaga   3/22/2018 3:00 PM Zulema Young OT [facility]   3/27/2018 4:00 PM Zulema Young OT [facility]   3/29/2018 4:00 PM Zulema Young OT [facility]   4/3/2018 4:00 PM Zulema Young OT [facility]   4/5/2018 4:00 PM Zulema Young, OT [facility]

## 2018-03-22 ENCOUNTER — APPOINTMENT (OUTPATIENT)
Dept: PHYSICAL THERAPY | Age: 53
End: 2018-03-22
Payer: OTHER GOVERNMENT

## 2018-03-27 ENCOUNTER — HOSPITAL ENCOUNTER (OUTPATIENT)
Dept: PHYSICAL THERAPY | Age: 53
Discharge: HOME | End: 2018-03-27
Payer: OTHER GOVERNMENT

## 2018-03-27 PROCEDURE — 97535 SELF CARE MNGMENT TRAINING: CPT

## 2018-03-28 NOTE — PROGRESS NOTES
[facility]  [address] [place] [address]  [place], [address]  [phone] fax    Occupational Therapy Progress Note  Patient name: Jose Christianson Start of Care: 18   Referral source: Tricia Carbajal NP : 1965   Medical/Treatment Diagnosis: Lymphedema of right upper extremity [I89.0]  Primary malignant neoplasm of central portion of right breast in female [facility]) [C50.111] Onset Date:5 years     Prior Hospitalization: see medical history Provider#: [id number]   Medications: Verified on Patient Summary List    Comorbidities: R breast cancer, diabetes, thyroid, HTN   Prior Level of Function: Independent without limitations in ADL and IADL activities. Visits from Start of Care: 16    Missed Visits: 4    Established Goals:         Excellent           Good         Limited           None  [x] Increased ROM   []  [x]  []  []  [] Increased Strength  []  []  []  []  [] Increased Mobility  []  []  []  []   [] Decreased Pain   []  []  []  []  [x] Decreased Swelling  []  []  [x]  []  [] Increased Fine Motor Skills []  []  []  []  [] Increased ADL [place] []  []  []  []    Key Functional Changes: UE GIRTH CHART measured in cm  Date: 11/27/17    2/27/18 3/6/18 3/20/18 3/27/18   Side R L R R R R   metacarpals 20 20 20   20.3 20.5 20.5   wrist 17.7 18   18 18 18 18   FA 31.5 31.5   31.0 31.7 31.9 31.9   elbow 32 32   31.9 32.5 32.7 32.7   biceps 43.5 43   46 44 46 46   axillary 42.5 42   43 42.5 43 [address]   350. 2          +1            Goals:    Short Term Goals: To be accomplished in 4 weeks  1) Pt will be Independent with skin care routine to decrease risk of infection. 2) Patient will demonstrate decongestion of limb by 1-2% . 3) Pt will verbalize with 100% accuracy which signs and symptoms to report immediately to physician concerning their condition.      Long Term Goals:  To be accomplished in 8 weeks  1) Pt will be Independent with compression management routine consisting of skin care, decongestive exercises, self-manual lymphatic stimulation techniques, strengthening and motion exercises, and don/doff/care of appropriate compression garment(s). 2) Pt will be Independent in don/doff/care of/wearing schedule of light compression. 3) Patient will demonstrate decongestion of limb by 3% to reduce symptoms  4) Pt will increase ROM of affected limb by 15 degrees in shoulder flexion and abduction in order to increase functional ROM and reduce symptoms    ASSESSMENT/RECOMMENDATIONS:  [x]Continue therapy per initial plan/protocol at a frequency of  2 x per week for 4 weeks  []Continue therapy with the following recommended changes:_____________________      _____________________________________________________________________  []Discontinue therapy progressing towards or have reached established goals  []Discontinue therapy due to lack of appreciable progress towards goals  []Discontinue therapy due to lack of attendance or compliance  []Await Physician's recommendations/decisions regarding therapy  []Other:________________________________________________________________    Thank you for this referral.   Herrera Moss OT 3/27/2018 8:36 AM  NOTE TO PHYSICIAN:  [address]   [facility]: [phone]  If you are unable to process this request in 24 hours please contact our office: [phone]    ? I have read the above report and request that my patient continue as recommended. ? I have read the above report and request that my patient continue therapy with the following changes/special instructions:__________________________________________________________  ? I have read the above report and request that my patient be discharged from therapy.     Physicians signature: ____________________________Date: ______Time:________

## 2018-03-28 NOTE — PROGRESS NOTES
OT DAILY TREATMENT NOTE  3-16    Patient Name: Hakan Hunter  Date:3/27/2018  : 1965  [x]  Patient  Verified  Payor: PETE / Plan: [address] / Product Type: [employer] /    In time:1630  Out time:1700  Total Treatment Time (min): 30  Visit #: 4  of 8    Treatment Area: Lymphedema of right upper extremity [I89.0]  Primary malignant neoplasm of central portion of right breast in female ([facility] [address] [C50.111]    SUBJECTIVE  Pain Level (0-10 scale): 0/10  Any medication changes, allergies to medications, adverse drug reactions, diagnosis change, or new procedure performed?: [x] No    [] Yes (see summary sheet for update)  Subjective functional status/changes:   [] No changes reported  My  put a third wrap on because we didn't think    OBJECTIVE    30 min Self Care/Home Management: wrapping instruction, education on technique and purpose. Compression wrap applied   Rationale: compression to improve the patients ability to reduce lymphedema in the rUE    With   [] TE   [] TA   [] neuro   [] other: Patient Education: [x] Review HEP    [] Progressed/Changed HEP based on:   [] positioning   [] body mechanics   [] transfers   [] heat/ice application   [] Splint wear/care   [] Sensory re-education   [] scar management      [] other:             Other Objective/Functional Measures: UE GIRTH CHART measured in cm  Date: 11/27/17    2/27/18 3/6/18 3/20/18 3/27/18   Side R L R R R R   metacarpals 20 20 20   20.3 20.5 20.5   wrist 17.7 18   18 18 18 18   FA 31.5 31.5   31.0 31.7 31.9 31.9   elbow 32 32   31.9 32.5 32.7 32.7   biceps 43.5 43   46 44 46 46   axillary 42.5 42   43 42.5 43 [facility] [address] [facility]. 2          +1             Pain Level (0-10 scale) post treatment: 0/10      ASSESSMENT/Changes in Function: Patient education on wrapping secondary to decreased understanding of the technique.     Patient will continue to benefit from skilled OT services to analyze and address soft tissue restrictions and lymphedema to attain remaining goals.      [x]  See Plan of Care  []  See progress note/recertification  []  See Discharge Summary      Progress towards goals / Updated goals:  Goals:    Short Term Goals: To be accomplished in 4 weeks  1) Pt will be Independent with skin care routine to decrease risk of infection. Goal met  2) Patient will demonstrate decongestion of limb by 1-2% . Goal met  3) Pt will verbalize with 100% accuracy which signs and symptoms to report immediately to physician concerning their condition. Goal met      Long Term Goals: To be accomplished in 8 weeks  1) Pt will be Independent with compression management routine consisting of skin care, decongestive exercises, self-manual lymphatic stimulation techniques, strengthening and motion exercises, and don/doff/care of appropriate compression garment(s). Goal met  2) Pt will be Independent in don/doff/care of/wearing schedule of light compression. Garment not ordered  3) Patient will demonstrate decongestion of limb by 3% to reduce symptoms. Goal not met  4) Pt will increase ROM of affected limb by 15 degrees in shoulder flexion and abduction in order to increase functional ROM and reduce symptoms.  Goal met    PLAN  [x]  Upgrade activities as tolerated     [x]  Continue plan of care  []  Update interventions per flow sheet       []  Discharge due to:_  []  Other:_      Rajani Carrasco OT 3/27/2018  9:19 PM    Future Appointments  Date Time Provider Fiorella Arriaga   4/3/2018 4:00 PM Segun Delaney OT [facility]   4/5/2018 4:00 PM Rajani Carrasco OT [facility]

## 2018-03-28 NOTE — PROGRESS NOTES
[facility]  [address]  [place], [address]  [phone] fax    Occupational Therapy Progress Note  Patient name: Kiarra Edwards Start of Care: 17   Referral source: Maria T Jauregui NP : 1965   Medical/Treatment Diagnosis: Lymphedema of right upper extremity [I89.0]  Primary malignant neoplasm of central portion of right breast in female [facility]) [C50.111] Onset Date:     Prior Hospitalization: see medical history Provider#: [id number]   Medications: Verified on Patient Summary List    Comorbidities: R breast cancer, diabetes, thyroid, HTN   Prior Level of Function: Independent without limitations in ADL and IADL activities. Visits from Start of Care: 10    Missed Visits: 4    Established Goals:         Excellent           Good         Limited           None  [x] Increased ROM   []  [x]  []  []  [] Increased Strength  []  []  []  []  [] Increased Mobility  []  []  []  []   [x] Decreased Pain   []  [x]  []  []  [x] Decreased Swelling  []  []  [x]  []  [] Increased Fine Motor Skills []  []  []  []  [] Increased ADL [place] []  []  []  []    Key Functional Changes: UE GIRTH CHART measured in cm  Date: 17   Side R L R R R R   metacarpals 20 20 20.5 20.5 20.3 20     wrist 17.7 18 17.5 18 18.2   18   FA 31.5 31.5 30 30.3 32   31.0   elbow 32 32 31 31.9 32.5   31.9   biceps 43.5 43 44.5 44.5 46   46   axillary 42.5 42 42 42.5 44.8 [address] [facility] [address]   347. 1                        Continued Goals: to be achieved in 8 weeks:  Short Term Goals: To be accomplished in 4 weeks  1) Pt will be Independent with skin care routine to decrease risk of infection. 2) Patient will demonstrate decongestion of limb by 1-2% . 3) Pt will verbalize with 100% accuracy which signs and symptoms to report immediately to physician concerning their condition.      Long Term Goals:  To be accomplished in 8 weeks  1) Pt will be Independent with compression management routine consisting of skin care, decongestive exercises, self-manual lymphatic stimulation techniques, strengthening and motion exercises, and don/doff/care of appropriate compression garment(s). 2) Pt will be Independent in don/doff/care of/wearing schedule of light compression. 3) Patient will demonstrate decongestion of limb by 3% to reduce symptoms  4) Pt will increase ROM of affected limb by 15 degrees in shoulder flexion and abduction in order to increase functional ROM and reduce symptoms    ASSESSMENT/RECOMMENDATIONS:  [x]Continue therapy per initial plan/protocol at a frequency of  2 x per week for 8 weeks  []Continue therapy with the following recommended changes:_____________________      _____________________________________________________________________  []Discontinue therapy progressing towards or have reached established goals  []Discontinue therapy due to lack of appreciable progress towards goals  []Discontinue therapy due to lack of attendance or compliance  []Await Physician's recommendations/decisions regarding therapy  []Other:________________________________________________________________    Thank you for this referral.   Rodger Nam OT 2/27/2018 8:23 AM  NOTE TO PHYSICIAN:  [address]   FAX TO [facility]: [phone]  If you are unable to process this request in 24 hours please contact our office: [phone]    ? I have read the above report and request that my patient continue as recommended. ? I have read the above report and request that my patient continue therapy with the following changes/special instructions:__________________________________________________________  ? I have read the above report and request that my patient be discharged from therapy.     Physicians signature: ____________________________Date: ______Time:________

## 2018-03-29 ENCOUNTER — APPOINTMENT (OUTPATIENT)
Dept: PHYSICAL THERAPY | Age: 53
End: 2018-03-29
Payer: OTHER GOVERNMENT

## 2018-04-03 ENCOUNTER — HOSPITAL ENCOUNTER (OUTPATIENT)
Dept: PHYSICAL THERAPY | Age: 53
Discharge: HOME | End: 2018-04-03
Payer: OTHER GOVERNMENT

## 2018-04-03 PROCEDURE — 97140 MANUAL THERAPY 1/> REGIONS: CPT

## 2018-04-03 NOTE — PROGRESS NOTES
OT DAILY TREATMENT NOTE  3-16    Patient Name: Beverly See  Date:4/3/2018  : 1965  [x]  Patient  Verified  Payor: PETE / Plan: [address] / Product Type: Sarkis Morel /    In time:1620  Out time:1700  Total Treatment Time (min): 40  Visit #: 1 of 8    Treatment Area: Lymphedema of right upper extremity [I89.0]  Primary malignant neoplasm of central portion of right breast in female ([facility] [address] [C50.111]    SUBJECTIVE  Pain Level (0-10 scale):0/10  Any medication changes, allergies to medications, adverse drug reactions, diagnosis change, or new procedure performed?: [x] No    [] Yes (see summary sheet for update)  Subjective functional status/changes:   [x] No changes reported      OBJECTIVE  30 min Manual Therapy:  MLD ipsilateral and contralateral sides to R UE   Rationale: increase tissue extensibility and decrease edema  to right UE     10 min Self Care/Home Management: compression wrapping,    Rationale: compression wrapping and education  to improve the patients ability to understand process for CDT and transition to garments     With   [] TE   [] TA   [] neuro   [] other: Patient Education: [x] Review HEP    [] Progressed/Changed HEP based on:   [] positioning   [] body mechanics   [] transfers   [] heat/ice application   [] Splint wear/care   [] Sensory re-education   [] scar management      [] other:           Other Objective/Functional Measures: UE GIRTH CHART measured in cm  Date: 11/27/17    12/12/17 12/19/17 1/23/18 2/27/18 3/6/18 3/20/18   Side R L R R R R R R   metacarpals 20 20 20.5 20.5 20.3 20   20.3 20.5   wrist 17.7 18 17.5 18 18.2   18 18 18   FA 31.5 31.5 30 30.3 32   31.0 31.7 31.9   elbow 32 32 31 31.9 32.5   31.9 32.5 32.7   biceps 43.5 43 44.5 44.5 46   46 44 46   axillary 42.5 42 42 42.5 44.8   43 42.5 43         [facility] [address]. 2                  +1       Pain Level (0-10 scale) post treatment: 0/10      ASSESSMENT/Changes in Function: presented without wrap since AM     Patient will continue to benefit from skilled OT services to analyze and address soft tissue restrictions and lymphedema to attain remaining goals.      [x]  See Plan of Care  []  See progress note/recertification  []  See Discharge Summary      Progress towards goals / Updated goals:  Goals:    Short Term Goals: To be accomplished in 4 weeks  1) Pt will be Independent with skin care routine to decrease risk of infection. 2) Patient will demonstrate decongestion of limb by 1-2% . 3) Pt will verbalize with 100% accuracy which signs and symptoms to report immediately to physician concerning their condition.      Long Term Goals: To be accomplished in 8 weeks  1) Pt will be Independent with compression management routine consisting of skin care, decongestive exercises, self-manual lymphatic stimulation techniques, strengthening and motion exercises, and don/doff/care of appropriate compression garment(s). 2) Pt will be Independent in don/doff/care of/wearing schedule of light compression.    3) Patient will demonstrate decongestion of limb by 3% to reduce symptoms  4) Pt will increase ROM of affected limb by 15 degrees in shoulder flexion and abduction in order to increase functional ROM and reduce symptoms  PLAN  []  Upgrade activities as tolerated     []  Continue plan of care  []  Update interventions per flow sheet       []  Discharge due to:_  []  Other:_      oCllins Nicole OT 4/3/2018  6:15 PM    Future Appointments  Date Time Provider Fiorella Arriaga   4/5/2018 4:00 PM Collins Nicole OT [id number]

## 2018-04-12 ENCOUNTER — APPOINTMENT (OUTPATIENT)
Dept: PHYSICAL THERAPY | Age: 53
End: 2018-04-12
Payer: OTHER GOVERNMENT

## 2018-05-01 ENCOUNTER — HOSPITAL ENCOUNTER (OUTPATIENT)
Dept: PHYSICAL THERAPY | Age: 53
Discharge: HOME | End: 2018-05-01
Payer: OTHER GOVERNMENT

## 2018-05-01 PROCEDURE — 97535 SELF CARE MNGMENT TRAINING: CPT

## 2018-05-01 NOTE — PROGRESS NOTES
[facility]  [address] [facility] [address]  [place], [address]  [phone] fax    Occupational Therapy Progress Note  Patient name: Daron Burgos Start of Care: 18   Referral source: Jennifer Almanza NP : 1965   Medical/Treatment Diagnosis: Lymphedema of right upper extremity [I89.0]  Primary malignant neoplasm of central portion of right breast in female [facility]) [C50.111] Onset Date:5 years     Prior Hospitalization: see medical history Provider#: [id number]   Medications: Verified on Patient Summary List    Comorbidities: R breast cancer, diabetes, thyroid, HTN   Prior Level of Function: Independent without limitations in ADL and IADL activities. Visits from Start of Care: 16   Missed Visits: 5+    Established Goals:         Excellent           Good         Limited           None  [] Increased ROM   []  []  []  []  [] Increased Strength  []  []  []  []  [] Increased Mobility  []  []  []  []   [] Decreased Pain   []  []  []  []  [x] Decreased Swelling  []  []  [x]  []  [] Increased Fine Motor Skills []  []  []  []  [] Increased ADL [place] []  []  []  []    Key Functional Changes: UE GIRTH CHART measured in cm  Date: 11/27/17    2/27/18 3/6/18 3/20/18 3/27/18 5/1/18   Side R L R R R R R   metacarpals 20 20 20   20.3 20.5 20.5 20.6   wrist 17.7 18   18 18 18 18 18   FA 31.5 31.5   31.0 31.7 31.9 31.9 32   elbow 32 32   31.9 32.5 32.7 32.7 32   biceps 43.5 43   46 44 46 46 44.5   axillary 42.5 42   43 42.5 43 43 44         [address]. 2          +1           Updated Goals: to be achieved in 4 visits:  1) Pt will be Independent in don/doff/care of/wearing schedule of light compression.    2) Pt will be fitted with well fitting compression garment for the right UE.    ASSESSMENT/RECOMMENDATIONS:  [x]Continue therapy per initial plan/protocol at a frequency of  4 visits  []Continue therapy with the following recommended changes:_____________________      _____________________________________________________________________  []Discontinue therapy progressing towards or have reached established goals  []Discontinue therapy due to lack of appreciable progress towards goals  []Discontinue therapy due to lack of attendance or compliance  []Await Physician's recommendations/decisions regarding therapy  [x]Other:______Patient needs order for compression garment for the right UE.  __________________________________________________________    Thank you for this referral.   Bridget Quintero OT 5/1/2018 5:22 PM  NOTE TO PHYSICIAN:  [address] TO [facility] Physical Therapy: [phone]  If you are unable to process this request in 24 hours please contact our office: [phone]    ? I have read the above report and request that my patient continue as recommended. ? I have read the above report and request that my patient continue therapy with the following changes/special instructions:__________________________________________________________  ? I have read the above report and request that my patient be discharged from therapy.     Physicians signature: ____________________________Date: ______Time:________

## 2018-05-02 NOTE — PROGRESS NOTES
OT DAILY TREATMENT NOTE  3-16    Patient Name: Martinez Varela  Date:2018  : 1965  [x]  Patient  Verified  Payor: PETE / Plan: [address] / Product Type: Vincent Hancock /    In time:1630  Out time:1720  Total Treatment Time (min): 50  Visit #:  2 of 8    Treatment Area: Lymphedema of right upper extremity [I89.0]  Primary malignant neoplasm of central portion of right breast in female ([facility] [address] [C50.111]    SUBJECTIVE  Pain Level (0-10 scale): 0/10  Any medication changes, allergies to medications, adverse drug reactions, diagnosis change, or new procedure performed?: [x] No    [] Yes (see summary sheet for update)  Subjective functional status/changes:   [] No changes reported  'I have been sick, but we have been wrapping the whole time. OBJECTIVE    50 min Self Care/Home Management: education on garments available, measurements, responsibilities   Rationale: education  to improve the patients ability to understand next step in control of lymphedema. With   [] TE   [] TA   [] neuro   [] other: Patient Education: [x] Review HEP    [] Progressed/Changed HEP based on:   [] positioning   [] body mechanics   [] transfers   [] heat/ice application   [] Splint wear/care   [] Sensory re-education   [] scar management      [] other:             Other Objective/Functional Measures: *UE GIRTH CHART measured in cm  Date: 11/27/17    12/12/17 12/19/17 1/23/18 2/27/18 3/6/18 3/20/18 5/1/18   Side R L R R R R R R R   metacarpals 20 20 20.5 20.5 20.3 20   20.3 20.5 20.6   wrist 17.7 18 17.5 18 18.2   18 18 18 18   FA 31.5 31.5 30 30.3 32   31.0 31.7 31.9 32   elbow 32 32 31 31.9 32.5   31.9 32.5 32.7 32   biceps 43.5 43 44.5 44.5 46   46 44 46 44.5   axillary 42.5 42 42 42.5 44.8   43 42.5 43 55 [facility]   451. 2                   +1            Pain Level (0-10 scale) post treatment: 0/10    ASSESSMENT/Changes in Function: Patient reports self compliance with wrapping and activities while absence. She will continue to perform until she has received garments. Patient will continue to benefit from skilled OT services to modify and progress therapeutic interventions to attain remaining goals. []  See Plan of Care  [x]  See progress note/recertification  []  See Discharge Summary         Progress towards goals / Updated goals:  Short Term Goals: To be accomplished in 4 weeks  1) Pt will be Independent with skin care routine to decrease risk of infection. 2) Patient will demonstrate decongestion of limb by 1-2% . 3) Pt will verbalize with 100% accuracy which signs and symptoms to report immediately to physician concerning their condition.      Long Term Goals: To be accomplished in 8 weeks  1) Pt will be Independent with compression management routine consisting of skin care, decongestive exercises, self-manual lymphatic stimulation techniques, strengthening and motion exercises, and don/doff/care of appropriate compression garment(s). 2) Pt will be Independent in don/doff/care of/wearing schedule of light compression. 3) Patient will demonstrate decongestion of limb by 3% to reduce symptoms  4) Pt will increase ROM of affected limb by 15 degrees in shoulder flexion and abduction in order to increase functional ROM and reduce symptoms    PLAN  []  Upgrade activities as tolerated     []  Continue plan of care  []  Update interventions per flow sheet       []  Discharge due to:_  [x]  Other:_  Patient has not attended in 4 weeks, she reports compliance with wrapping at home and will be fitted for compression garments. Adin Chambers, ANGLE 5/1/2018  8:19 AM    No future appointments.

## 2018-09-18 NOTE — PROGRESS NOTES
[facility]  [address] [facility] [address]  [place], [address]  [phone] fax    Patient name: Herminio Jackson Start of Care: 18   Referral source: Amaya lAy NP : 1965   Medical/Treatment Diagnosis: Lymphedema of right upper extremity [I89.0]  Primary malignant neoplasm of central portion of right breast in female [facility]) [C50.111] Onset Date:5 years   Prior Hospitalization: see medical history Provider#: [id number]   Medications: Verified on Patient Summary List     Comorbidities: R breast cancer, diabetes, thyroid, HTN   Prior Level of Function: Independent without limitations in ADL and IADL activities. Visits from Start of Care: 16                                                                  Missed Visits: 5+  Reporting Period : 18    Summary of Care:  *UE GIRTH CHART measured in cm  Date: 11/27/17    12/12/17 12/19/17 1/23/18 2/27/18 3/6/18 3/20/18 5/1/18   Side R L R R R R R R R   metacarpals 20 20 20.5 20.5 20.3 20   20.3 20.5 20.6   wrist 17.7 18 17.5 18 18.2   18 18 18 18   FA 31.5 31.5 30 30.3 32   31.0 31.7 31.9 32   elbow 32 32 31 31.9 32.5   31.9 32.5 32.7 32   biceps 43.5 43 44.5 44.5 46   46 44 46 44.5   axillary 42.5 42 42 42.5 44.8   43 42.5 43 [address] [facility] [address] [facility]             234. 2                   +1          Progress towards goals / Updated goals:  Short Term Goals: To be accomplished in 4 weeks  1) Pt will be Independent with skin care routine to decrease risk of infection. 2) Patient will demonstrate decongestion of limb by 1-2% . Goal met  3) Pt will verbalize with 100% accuracy which signs and symptoms to report immediately to physician concerning their condition. Goal met      Long Term Goals:  To be accomplished in 8 weeks  1) Pt will be Independent with compression management routine consisting of skin care, decongestive exercises, self-manual lymphatic stimulation techniques, strengthening and motion exercises, and don/doff/care of appropriate compression garment(s). Goal met  2) Pt will be Independent in don/doff/care of/wearing schedule of light compression. Goal met  3) Patient will demonstrate decongestion of limb by 3% to reduce symptoms . Not met  4) Pt will increase ROM of affected limb by 15 degrees in shoulder flexion and abduction in order to increase functional ROM and reduce symptoms.   Goal met    ASSESSMENT/RECOMMENDATIONS:  [x]Discontinue therapy: [x]Patient has reached or is progressing toward set goals      []Patient is non-compliant or has abdicated      []Due to lack of appreciable progress towards set goals    Rasheed Sarkar OT 5/1/2018 2:25 PM